# Patient Record
Sex: FEMALE | Race: WHITE
[De-identification: names, ages, dates, MRNs, and addresses within clinical notes are randomized per-mention and may not be internally consistent; named-entity substitution may affect disease eponyms.]

---

## 2018-06-22 ENCOUNTER — HOSPITAL ENCOUNTER (EMERGENCY)
Age: 23
Discharge: HOME | End: 2018-06-22

## 2018-06-22 ENCOUNTER — HOSPITAL ENCOUNTER (EMERGENCY)
Dept: HOSPITAL 91 - FTE | Age: 23
Discharge: HOME | End: 2018-06-22
Payer: COMMERCIAL

## 2018-06-22 DIAGNOSIS — N93.9: Primary | ICD-10-CM

## 2018-06-22 DIAGNOSIS — R10.2: ICD-10-CM

## 2018-06-22 LAB
ADD MAN DIFF?: NO
BASOPHIL #: 0.1 10^3/UL (ref 0–0.1)
BASOPHILS %: 0.6 % (ref 0–2)
EOSINOPHILS #: 0 10^3/UL (ref 0–0.5)
EOSINOPHILS %: 0.1 % (ref 0–7)
HEMATOCRIT: 37.1 % (ref 37–47)
HEMOGLOBIN: 12.7 G/DL (ref 12–16)
LYMPHOCYTES #: 1.7 10^3/UL (ref 0.8–2.9)
LYMPHOCYTES %: 14.9 % (ref 15–51)
MEAN CORPUSCULAR HEMOGLOBIN: 29.3 PG (ref 29–33)
MEAN CORPUSCULAR HGB CONC: 34.2 G/DL (ref 32–37)
MEAN CORPUSCULAR VOLUME: 85.7 FL (ref 82–101)
MEAN PLATELET VOLUME: 8.8 FL (ref 7.4–10.4)
MONOCYTE #: 0.5 10^3/UL (ref 0.3–0.9)
MONOCYTES %: 4.3 % (ref 0–11)
NEUTROPHIL #: 9.2 10^3/UL (ref 1.6–7.5)
NEUTROPHILS %: 79.8 % (ref 39–77)
NUCLEATED RED BLOOD CELLS #: 0 10^3/UL (ref 0–0)
NUCLEATED RED BLOOD CELLS%: 0 /100WBC (ref 0–0)
PLATELET COUNT: 324 10^3/UL (ref 140–415)
RED BLOOD COUNT: 4.33 10^6/UL (ref 4.2–5.4)
RED CELL DISTRIBUTION WIDTH: 12.1 % (ref 11.5–14.5)
WHITE BLOOD COUNT: 11.5 10^3/UL (ref 4.8–10.8)

## 2018-06-22 PROCEDURE — 81025 URINE PREGNANCY TEST: CPT

## 2018-06-22 PROCEDURE — 36415 COLL VENOUS BLD VENIPUNCTURE: CPT

## 2018-06-22 PROCEDURE — 84702 CHORIONIC GONADOTROPIN TEST: CPT

## 2018-06-22 PROCEDURE — 99284 EMERGENCY DEPT VISIT MOD MDM: CPT

## 2018-06-22 PROCEDURE — 76830 TRANSVAGINAL US NON-OB: CPT

## 2018-06-22 PROCEDURE — 76856 US EXAM PELVIC COMPLETE: CPT

## 2018-06-22 PROCEDURE — 85025 COMPLETE CBC W/AUTO DIFF WBC: CPT

## 2019-03-02 ENCOUNTER — HOSPITAL ENCOUNTER (EMERGENCY)
Dept: HOSPITAL 54 - ER | Age: 24
Discharge: HOME | End: 2019-03-02
Payer: MEDICAID

## 2019-03-02 VITALS — BODY MASS INDEX: 24.8 KG/M2 | HEIGHT: 63 IN | WEIGHT: 140 LBS

## 2019-03-02 VITALS — DIASTOLIC BLOOD PRESSURE: 72 MMHG | SYSTOLIC BLOOD PRESSURE: 125 MMHG

## 2019-03-02 DIAGNOSIS — M21.332: Primary | ICD-10-CM

## 2019-03-02 PROCEDURE — 29125 APPL SHORT ARM SPLINT STATIC: CPT

## 2019-03-02 PROCEDURE — 99283 EMERGENCY DEPT VISIT LOW MDM: CPT

## 2019-03-02 NOTE — NUR
BIBSELF C/O LEFT ARM AND LEFT LEG NUMBNESS AND TINGLING X1 DAY. +NECK PAIN

-TRAUMA, -N/D,+V, -HEADACHE,-SOB,-CHEST PAIN, TO ER BED 6, HOOKED TO MONITOR, 
AWAITING MD WEINBERG

## 2019-09-22 ENCOUNTER — HOSPITAL ENCOUNTER (EMERGENCY)
Dept: HOSPITAL 54 - ER | Age: 24
Discharge: HOME | End: 2019-09-22
Payer: SELF-PAY

## 2019-09-22 VITALS — HEIGHT: 63 IN | WEIGHT: 140 LBS | BODY MASS INDEX: 24.8 KG/M2

## 2019-09-22 VITALS — DIASTOLIC BLOOD PRESSURE: 85 MMHG | SYSTOLIC BLOOD PRESSURE: 135 MMHG

## 2019-09-22 DIAGNOSIS — L03.113: Primary | ICD-10-CM

## 2019-12-17 ENCOUNTER — HOSPITAL ENCOUNTER (EMERGENCY)
Dept: HOSPITAL 54 - ER | Age: 24
Discharge: HOME | End: 2019-12-17
Payer: SELF-PAY

## 2019-12-17 VITALS — WEIGHT: 140 LBS | HEIGHT: 63 IN | BODY MASS INDEX: 24.8 KG/M2

## 2019-12-17 VITALS — DIASTOLIC BLOOD PRESSURE: 69 MMHG | SYSTOLIC BLOOD PRESSURE: 104 MMHG

## 2019-12-17 DIAGNOSIS — Z79.899: ICD-10-CM

## 2019-12-17 DIAGNOSIS — R11.2: ICD-10-CM

## 2019-12-17 DIAGNOSIS — J20.9: Primary | ICD-10-CM

## 2020-11-26 ENCOUNTER — HOSPITAL ENCOUNTER (EMERGENCY)
Dept: HOSPITAL 54 - ER | Age: 25
Discharge: HOME | End: 2020-11-26
Payer: COMMERCIAL

## 2020-11-26 VITALS — BODY MASS INDEX: 24.8 KG/M2 | HEIGHT: 63 IN | WEIGHT: 140 LBS

## 2020-11-26 VITALS — SYSTOLIC BLOOD PRESSURE: 123 MMHG | DIASTOLIC BLOOD PRESSURE: 85 MMHG
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
## 2020-11-26 DIAGNOSIS — T59.811A: Primary | ICD-10-CM

## 2020-11-26 DIAGNOSIS — R51.9: ICD-10-CM

## 2021-04-04 ENCOUNTER — HOSPITAL ENCOUNTER (EMERGENCY)
Dept: HOSPITAL 54 - ER | Age: 26
Discharge: HOME | End: 2021-04-04
Payer: COMMERCIAL

## 2021-04-04 VITALS — DIASTOLIC BLOOD PRESSURE: 79 MMHG | SYSTOLIC BLOOD PRESSURE: 135 MMHG

## 2021-04-04 VITALS — BODY MASS INDEX: 26.58 KG/M2 | WEIGHT: 150 LBS | HEIGHT: 63 IN

## 2021-04-04 DIAGNOSIS — Z79.899: ICD-10-CM

## 2021-04-04 DIAGNOSIS — S02.2XXA: Primary | ICD-10-CM
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
## 2021-04-04 NOTE — NUR
Patient discharged to home in stable condition. Written and verbal after care 
instructions given. Patient verbalizes understanding of instruction. The 
patient left ER in stable condition.

## 2021-09-09 ENCOUNTER — HOSPITAL ENCOUNTER (EMERGENCY)
Dept: HOSPITAL 54 - ER | Age: 26
Discharge: HOME | End: 2021-09-09
Payer: COMMERCIAL

## 2021-09-09 VITALS — DIASTOLIC BLOOD PRESSURE: 67 MMHG | SYSTOLIC BLOOD PRESSURE: 119 MMHG

## 2021-09-09 VITALS — BODY MASS INDEX: 30.12 KG/M2 | HEIGHT: 63 IN | WEIGHT: 170 LBS

## 2021-09-09 DIAGNOSIS — M54.2: Primary | ICD-10-CM
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
## 2021-09-09 DIAGNOSIS — Z79.899: ICD-10-CM
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
## 2022-04-06 ENCOUNTER — HOSPITAL ENCOUNTER (EMERGENCY)
Dept: HOSPITAL 54 - ER | Age: 27
Discharge: LEFT BEFORE DISCHARGE | End: 2022-04-06
Payer: COMMERCIAL

## 2022-04-06 VITALS — SYSTOLIC BLOOD PRESSURE: 137 MMHG | DIASTOLIC BLOOD PRESSURE: 70 MMHG

## 2022-04-06 VITALS — WEIGHT: 160 LBS | HEIGHT: 63 IN | BODY MASS INDEX: 28.35 KG/M2

## 2022-04-06 DIAGNOSIS — R10.9: Primary | ICD-10-CM

## 2022-04-06 DIAGNOSIS — M54.9: ICD-10-CM

## 2022-04-06 NOTE — NUR
BIBS C/O LOWER ABDOMINAL PAIN RADIATING TO LOWER BACK "FEELS BLOATED" [id number] 
DENIES PAIN WHEN URINATING. PATIENT ALERT AND ORIENTED X3. AMBULATORY WITH NON 
LABORED BREAHTING IN BED 16 AWAITING MD WEINBERG AND IN A GOWN.